# Patient Record
Sex: MALE | URBAN - METROPOLITAN AREA
[De-identification: names, ages, dates, MRNs, and addresses within clinical notes are randomized per-mention and may not be internally consistent; named-entity substitution may affect disease eponyms.]

---

## 2019-12-31 ENCOUNTER — APPOINTMENT (RX ONLY)
Dept: URBAN - METROPOLITAN AREA CLINIC 152 | Facility: CLINIC | Age: 24
Setting detail: DERMATOLOGY
End: 2019-12-31

## 2019-12-31 DIAGNOSIS — L663 OTHER SPECIFIED DISEASES OF HAIR AND HAIR FOLLICLES: ICD-10-CM

## 2019-12-31 DIAGNOSIS — L20.89 OTHER ATOPIC DERMATITIS: ICD-10-CM

## 2019-12-31 DIAGNOSIS — L738 OTHER SPECIFIED DISEASES OF HAIR AND HAIR FOLLICLES: ICD-10-CM

## 2019-12-31 DIAGNOSIS — L73.9 FOLLICULAR DISORDER, UNSPECIFIED: ICD-10-CM

## 2019-12-31 PROBLEM — L02.02 FURUNCLE OF FACE: Status: ACTIVE | Noted: 2019-12-31

## 2019-12-31 PROBLEM — L20.84 INTRINSIC (ALLERGIC) ECZEMA: Status: ACTIVE | Noted: 2019-12-31

## 2019-12-31 PROCEDURE — ? COUNSELING

## 2019-12-31 PROCEDURE — ? PRESCRIPTION

## 2019-12-31 PROCEDURE — 99203 OFFICE O/P NEW LOW 30 MIN: CPT

## 2019-12-31 RX ORDER — CLINDAMYCIN PHOSPHATE AND BENZOYL PEROXIDE 10; 50 MG/G; MG/G
GEL TOPICAL
Qty: 1 | Refills: 2 | Status: ERX | COMMUNITY
Start: 2019-12-31

## 2019-12-31 RX ORDER — TRIAMCINOLONE ACETONIDE 1 MG/G
OINTMENT TOPICAL
Qty: 1 | Refills: 1 | Status: ERX | COMMUNITY
Start: 2019-12-31

## 2019-12-31 RX ADMIN — CLINDAMYCIN PHOSPHATE AND BENZOYL PEROXIDE: 10; 50 GEL TOPICAL at 00:00

## 2019-12-31 RX ADMIN — TRIAMCINOLONE ACETONIDE: 1 OINTMENT TOPICAL at 00:00

## 2019-12-31 ASSESSMENT — LOCATION DETAILED DESCRIPTION DERM
LOCATION DETAILED: LEFT RADIAL DORSAL HAND
LOCATION DETAILED: RIGHT RADIAL DORSAL HAND
LOCATION DETAILED: LEFT CENTRAL SUBMANDIBULAR CHEEK
LOCATION DETAILED: RIGHT MEDIAL MANDIBULAR CHEEK
LOCATION DETAILED: SUBMENTAL CHIN
LOCATION DETAILED: LEFT VENTRAL DISTAL FOREARM
LOCATION DETAILED: LEFT ULNAR DORSAL HAND

## 2019-12-31 ASSESSMENT — LOCATION SIMPLE DESCRIPTION DERM
LOCATION SIMPLE: LEFT HAND
LOCATION SIMPLE: RIGHT CHEEK
LOCATION SIMPLE: SUBMENTAL CHIN
LOCATION SIMPLE: LEFT CHEEK
LOCATION SIMPLE: RIGHT HAND
LOCATION SIMPLE: LEFT FOREARM

## 2019-12-31 ASSESSMENT — LOCATION ZONE DERM
LOCATION ZONE: FACE
LOCATION ZONE: HAND
LOCATION ZONE: ARM

## 2020-12-30 ENCOUNTER — APPOINTMENT (RX ONLY)
Dept: URBAN - METROPOLITAN AREA CLINIC 152 | Facility: CLINIC | Age: 25
Setting detail: DERMATOLOGY
End: 2020-12-30

## 2020-12-30 VITALS — HEIGHT: 70 IN | WEIGHT: 180 LBS

## 2020-12-30 DIAGNOSIS — L738 OTHER SPECIFIED DISEASES OF HAIR AND HAIR FOLLICLES: ICD-10-CM

## 2020-12-30 DIAGNOSIS — L73.9 FOLLICULAR DISORDER, UNSPECIFIED: ICD-10-CM

## 2020-12-30 DIAGNOSIS — L64.8 OTHER ANDROGENIC ALOPECIA: ICD-10-CM

## 2020-12-30 DIAGNOSIS — L663 OTHER SPECIFIED DISEASES OF HAIR AND HAIR FOLLICLES: ICD-10-CM

## 2020-12-30 PROBLEM — J45.909 UNSPECIFIED ASTHMA, UNCOMPLICATED: Status: ACTIVE | Noted: 2020-12-30

## 2020-12-30 PROBLEM — L02.02 FURUNCLE OF FACE: Status: ACTIVE | Noted: 2020-12-30

## 2020-12-30 PROCEDURE — ? COUNSELING

## 2020-12-30 PROCEDURE — 99214 OFFICE O/P EST MOD 30 MIN: CPT

## 2020-12-30 PROCEDURE — ? PRESCRIPTION

## 2020-12-30 RX ORDER — FLUOCINONIDE 0.5 MG/ML
SOLUTION TOPICAL
Qty: 1 | Refills: 3 | Status: ERX | COMMUNITY
Start: 2020-12-30

## 2020-12-30 RX ORDER — CLINDAMYCIN PHOSPHATE AND BENZOYL PEROXIDE 10; 50 MG/G; MG/G
GEL TOPICAL
Qty: 1 | Refills: 2 | Status: ERX

## 2020-12-30 RX ADMIN — FLUOCINONIDE: 0.5 SOLUTION TOPICAL at 00:00

## 2020-12-30 ASSESSMENT — LOCATION DETAILED DESCRIPTION DERM
LOCATION DETAILED: RIGHT CHIN
LOCATION DETAILED: LEFT LATERAL SUBMANDIBULAR CHEEK
LOCATION DETAILED: LEFT SUPERIOR MEDIAL FOREHEAD

## 2020-12-30 ASSESSMENT — LOCATION ZONE DERM: LOCATION ZONE: FACE

## 2020-12-30 ASSESSMENT — LOCATION SIMPLE DESCRIPTION DERM
LOCATION SIMPLE: CHIN
LOCATION SIMPLE: LEFT FOREHEAD
LOCATION SIMPLE: LEFT CHEEK

## 2020-12-31 ENCOUNTER — RX ONLY (OUTPATIENT)
Age: 25
Setting detail: RX ONLY
End: 2020-12-31

## 2020-12-31 RX ORDER — CLINDAMYCIN PHOSPHATE AND BENZOYL PEROXIDE 10; 50 MG/G; MG/G
GEL TOPICAL
Qty: 1 | Refills: 2 | Status: ERX

## 2021-03-31 ENCOUNTER — APPOINTMENT (RX ONLY)
Dept: URBAN - METROPOLITAN AREA CLINIC 152 | Facility: CLINIC | Age: 26
Setting detail: DERMATOLOGY
End: 2021-03-31

## 2021-03-31 DIAGNOSIS — L64.8 OTHER ANDROGENIC ALOPECIA: ICD-10-CM | Status: INADEQUATELY CONTROLLED

## 2021-03-31 DIAGNOSIS — L663 OTHER SPECIFIED DISEASES OF HAIR AND HAIR FOLLICLES: ICD-10-CM | Status: INADEQUATELY CONTROLLED

## 2021-03-31 DIAGNOSIS — L73.9 FOLLICULAR DISORDER, UNSPECIFIED: ICD-10-CM | Status: INADEQUATELY CONTROLLED

## 2021-03-31 DIAGNOSIS — L738 OTHER SPECIFIED DISEASES OF HAIR AND HAIR FOLLICLES: ICD-10-CM | Status: INADEQUATELY CONTROLLED

## 2021-03-31 PROBLEM — L02.02 FURUNCLE OF FACE: Status: ACTIVE | Noted: 2021-03-31

## 2021-03-31 PROBLEM — L02.821 FURUNCLE OF HEAD [ANY PART, EXCEPT FACE]: Status: ACTIVE | Noted: 2021-03-31

## 2021-03-31 PROCEDURE — 99214 OFFICE O/P EST MOD 30 MIN: CPT | Mod: 95

## 2021-03-31 PROCEDURE — ? COUNSELING

## 2021-03-31 PROCEDURE — ? PRESCRIPTION

## 2021-03-31 RX ORDER — CLOBETASOL PROPIONATE 0.5 MG/ML
SOLUTION TOPICAL
Qty: 1 | Refills: 2 | Status: ERX | COMMUNITY
Start: 2021-03-31

## 2021-03-31 RX ORDER — CLINDAMYCIN PHOSPHATE AND BENZOYL PEROXIDE 10; 50 MG/G; MG/G
GEL TOPICAL
Qty: 1 | Refills: 2 | Status: ERX

## 2021-03-31 RX ADMIN — CLOBETASOL PROPIONATE: 0.5 SOLUTION TOPICAL at 00:00

## 2021-03-31 ASSESSMENT — LOCATION DETAILED DESCRIPTION DERM
LOCATION DETAILED: LEFT SUPERIOR PARIETAL SCALP
LOCATION DETAILED: RIGHT SUPERIOR MEDIAL BUCCAL CHEEK
LOCATION DETAILED: RIGHT SUPERIOR LATERAL BUCCAL CHEEK
LOCATION DETAILED: LEFT SUPERIOR LATERAL BUCCAL CHEEK

## 2021-03-31 ASSESSMENT — LOCATION SIMPLE DESCRIPTION DERM
LOCATION SIMPLE: LEFT CHEEK
LOCATION SIMPLE: SCALP
LOCATION SIMPLE: RIGHT CHEEK

## 2021-03-31 ASSESSMENT — LOCATION ZONE DERM
LOCATION ZONE: SCALP
LOCATION ZONE: FACE

## 2022-10-18 ENCOUNTER — OFFICE VISIT (OUTPATIENT)
Dept: INTERNAL MEDICINE | Facility: CLINIC | Age: 27
End: 2022-10-18

## 2022-10-18 VITALS
OXYGEN SATURATION: 97 % | BODY MASS INDEX: 28.58 KG/M2 | HEART RATE: 80 BPM | SYSTOLIC BLOOD PRESSURE: 112 MMHG | TEMPERATURE: 98.2 F | WEIGHT: 193 LBS | HEIGHT: 69 IN | DIASTOLIC BLOOD PRESSURE: 78 MMHG

## 2022-10-18 DIAGNOSIS — Z76.89 ENCOUNTER TO ESTABLISH CARE: Primary | ICD-10-CM

## 2022-10-18 DIAGNOSIS — F41.8 ANXIETY WITH DEPRESSION: ICD-10-CM

## 2022-10-18 PROCEDURE — 99204 OFFICE O/P NEW MOD 45 MIN: CPT | Performed by: NURSE PRACTITIONER

## 2022-10-18 RX ORDER — BUPROPION HYDROCHLORIDE 150 MG/1
150 TABLET ORAL DAILY
Qty: 90 TABLET | Refills: 1 | Status: SHIPPED | OUTPATIENT
Start: 2022-10-18 | End: 2023-03-31

## 2022-10-18 RX ORDER — BUPROPION HYDROCHLORIDE 100 MG/1
TABLET ORAL
COMMUNITY
End: 2022-10-18

## 2022-10-18 NOTE — PROGRESS NOTES
"Chief Complaint   Patient presents with   • Establish Care     Needs med refills     Subjective   Clifton Moran is a 27 y.o. male.     History of Present Illness     Patent presents to establish care and medication refill. Moved here from [place], originally from [place]. Pending records, requested.   Taking Wellbutrin 150 mg XR for mixed anxiety/depression. Currently stable per patient.   No other medical history or concerns.  He did have labs about 6 months ago with previous PCP, but does not think he has had physical exam.   States Vitamin D was low, but that is the only thing they told him about labs. He has been taking otc Vit D supplement and getting 15 mins of sunlight daily.  He is due Tdap, COVID-19 booster, had influenza vaccine last month.  Wants to wait and return for vaccinations on later date.     The following portions of the patient's history were reviewed and updated as appropriate: allergies, current medications, past family history, past medical history, past social history, past surgical history and problem list.    Review of Systems   Psychiatric/Behavioral: Positive for dysphoric mood. The patient is nervous/anxious.    All other systems reviewed and are negative.      Objective   /78   Pulse 80   Temp 98.2 °F (36.8 °C) (Temporal)   Ht 175.3 cm (69\")   Wt 87.5 kg (193 lb)   SpO2 97%   BMI 28.50 kg/m²   Body mass index is 28.5 kg/m².  Physical Exam  Vitals and nursing note reviewed.   Constitutional:       Appearance: Normal appearance.   HENT:      Head: Normocephalic and atraumatic.      Right Ear: External ear normal.      Left Ear: External ear normal.      Nose: Nose normal.   Eyes:      General:         Right eye: No discharge.         Left eye: No discharge.      Extraocular Movements: Extraocular movements intact.   Cardiovascular:      Rate and Rhythm: Normal rate and regular rhythm.   Pulmonary:      Effort: Pulmonary effort is normal.      Breath sounds: Normal breath " sounds.   Musculoskeletal:         General: Normal range of motion.      Cervical back: Normal range of motion.   Neurological:      Mental Status: He is alert and oriented to person, place, and time.   Psychiatric:         Mood and Affect: Mood normal.         Behavior: Behavior normal.         Thought Content: Thought content normal.         Judgment: Judgment normal.       Assessment & Plan   Clifton Moran is here today and the following problems have been addressed:      Diagnoses and all orders for this visit:    1. Encounter to establish care (Primary)    2. Anxiety with depression  -     buPROPion XL (Wellbutrin XL) 150 MG 24 hr tablet; Take 1 tablet by mouth Daily.  Dispense: 90 tablet; Refill: 1      Pleasant 27 year old male presents to establish care today. Refilled Wellbutrin 150 mg, continue to take daily as prescribed.  Tdap administered in office, tolerated well. VIS given.  Pending records/labs from previous PCP.  No other concerns or complaints today.   Will return for COVID-19 booster and physical exam.     Follow Up   Return for Annual.  Patient was given instructions and counseling regarding his condition or for health maintenance advice. Please see specific information pulled into the AVS if appropriate.     America THURMAN  [facility] [place]

## 2023-03-03 DIAGNOSIS — F41.8 ANXIETY WITH DEPRESSION: ICD-10-CM

## 2023-03-03 RX ORDER — BUPROPION HYDROCHLORIDE 150 MG/1
150 TABLET ORAL DAILY
Qty: 90 TABLET | Refills: 1 | Status: CANCELLED | OUTPATIENT
Start: 2023-03-03

## 2023-03-31 DIAGNOSIS — F41.8 ANXIETY WITH DEPRESSION: ICD-10-CM

## 2023-03-31 RX ORDER — BUPROPION HYDROCHLORIDE 150 MG/1
TABLET ORAL
Qty: 30 TABLET | Refills: 5 | Status: SHIPPED | OUTPATIENT
Start: 2023-03-31

## 2023-04-18 ENCOUNTER — OFFICE VISIT (OUTPATIENT)
Dept: INTERNAL MEDICINE | Facility: CLINIC | Age: 28
End: 2023-04-18
Payer: COMMERCIAL

## 2023-04-18 VITALS
WEIGHT: 188 LBS | OXYGEN SATURATION: 98 % | BODY MASS INDEX: 27.85 KG/M2 | HEIGHT: 69 IN | HEART RATE: 79 BPM | DIASTOLIC BLOOD PRESSURE: 60 MMHG | SYSTOLIC BLOOD PRESSURE: 90 MMHG | TEMPERATURE: 97.2 F

## 2023-04-18 DIAGNOSIS — Z11.59 ENCOUNTER FOR HEPATITIS C SCREENING TEST FOR LOW RISK PATIENT: ICD-10-CM

## 2023-04-18 DIAGNOSIS — E55.9 VITAMIN D DEFICIENCY: ICD-10-CM

## 2023-04-18 DIAGNOSIS — Z13.228 SCREENING FOR ENDOCRINE, METABOLIC AND IMMUNITY DISORDER: ICD-10-CM

## 2023-04-18 DIAGNOSIS — Z13.0 SCREENING FOR ENDOCRINE, METABOLIC AND IMMUNITY DISORDER: ICD-10-CM

## 2023-04-18 DIAGNOSIS — Z13.220 SCREENING FOR CHOLESTEROL LEVEL: ICD-10-CM

## 2023-04-18 DIAGNOSIS — Z00.00 ANNUAL PHYSICAL EXAM: Primary | ICD-10-CM

## 2023-04-18 DIAGNOSIS — B35.9 RINGWORM: ICD-10-CM

## 2023-04-18 DIAGNOSIS — Z23 NEED FOR TDAP VACCINATION: ICD-10-CM

## 2023-04-18 DIAGNOSIS — Z13.29 SCREENING FOR ENDOCRINE, METABOLIC AND IMMUNITY DISORDER: ICD-10-CM

## 2023-04-18 DIAGNOSIS — F41.8 ANXIETY WITH DEPRESSION: ICD-10-CM

## 2023-04-18 DIAGNOSIS — Z13.29 SCREENING FOR THYROID DISORDER: ICD-10-CM

## 2023-04-18 DIAGNOSIS — Z13.0 SCREENING FOR DEFICIENCY ANEMIA: ICD-10-CM

## 2023-04-18 DIAGNOSIS — L25.9 CONTACT DERMATITIS, UNSPECIFIED CONTACT DERMATITIS TYPE, UNSPECIFIED TRIGGER: ICD-10-CM

## 2023-04-18 DIAGNOSIS — Z13.1 SCREENING FOR DIABETES MELLITUS (DM): ICD-10-CM

## 2023-04-18 DIAGNOSIS — E66.3 OVERWEIGHT WITH BODY MASS INDEX (BMI) OF 27 TO 27.9 IN ADULT: ICD-10-CM

## 2023-04-18 RX ORDER — BUPROPION HYDROCHLORIDE 150 MG/1
150 TABLET ORAL DAILY
Qty: 90 TABLET | Refills: 3 | Status: SHIPPED | OUTPATIENT
Start: 2023-04-18

## 2023-04-18 RX ORDER — KETOCONAZOLE 20 MG/G
1 CREAM TOPICAL DAILY
Qty: 60 G | Refills: 0 | Status: SHIPPED | OUTPATIENT
Start: 2023-04-18

## 2023-04-18 RX ORDER — CLOBETASOL PROPIONATE 0.5 MG/G
1 OINTMENT TOPICAL 2 TIMES DAILY
Qty: 60 G | Refills: 0 | Status: SHIPPED | OUTPATIENT
Start: 2023-04-18

## 2023-04-18 NOTE — PROGRESS NOTES
Subjective   Clifton Moran is a 27 y.o. male and is here for a comprehensive physical exam.   Patient has a rash on his right hand for 2-3 weeks.  He came in contact with his girlfriend who had poison ivy, that is the only thing he can think of that might have caused the rash.  No new lotions, soaps, detergents.  He has not tried anything on the rash.  Can of itched and bothering him at first, but no pain associated with it today.  It started out as vesicles that popped and drained an dried up afterwards.  He has a rash on the outside of left leg, annular and crusting, it has been there 8+ months, he has just put moisturizer on it  He is needing a refill of his Wellbutrin for anxiety and depression, states this is stable on the medication.    HPI:      Health Habits:  Eye exam within last 2 years? No, will schedule  Dental exam every 6 months? No, will schedule  Exercise habits: 3 days per week   Healthy diet? Unhealthy   Caffeine: None, patient states sensitive to caffeine and causes palpitations  Alcohol: once per month   Nicotine: No   Do you take any herbs or supplements that were not prescribed by a doctor? Vit D supplement, daily MV     History:  Family history of prostate/colon cancer: No      reports being sexually active and has had partner(s) who are female. He reports using the following method of birth control/protection: Pill.    The following portions of the patient's history were reviewed and updated as appropriate: He  has a past medical history of ADHD (attention deficit hyperactivity disorder), Anxiety, Depression, and History of medical problems.  He does not have a problem list on file.  He  has a past surgical history that includes Stomach surgery.  His family history includes Alcohol abuse in his father; Asthma in his brother; Birth defects in his paternal uncle; Depression in his mother.  He  reports that he has never smoked. He does not have any smokeless tobacco history on file. He  "reports that he does not currently use alcohol after a past usage of about 1.0 standard drink per week. He reports that he does not use drugs.  Current Outpatient Medications   Medication Sig Dispense Refill   • buPROPion XL (WELLBUTRIN XL) 150 MG 24 hr tablet Take 1 tablet by mouth Daily. 90 tablet 3   • clobetasol (TEMOVATE) 0.05 % ointment Apply 1 application topically to the appropriate area as directed 2 (Two) Times a Day. Right hand 60 g 0   • ketoconazole (NIZORAL) 2 % cream Apply 1 application topically to the appropriate area as directed Daily. Left leg 60 g 0     No current facility-administered medications for this visit.       Objective   BP 90/60 (BP Location: Right arm, Patient Position: Sitting, Cuff Size: Adult)   Pulse 79   Temp 97.2 °F (36.2 °C) (Temporal)   Ht 175.3 cm (69\")   Wt 85.3 kg (188 lb)   SpO2 98%   BMI 27.76 kg/m²     Physical Exam  Vitals and nursing note reviewed.   Constitutional:       General: He is not in acute distress.     Appearance: Normal appearance. He is overweight. He is not diaphoretic.   HENT:      Head: Normocephalic and atraumatic.      Right Ear: Tympanic membrane, ear canal and external ear normal.      Left Ear: Tympanic membrane, ear canal and external ear normal.      Nose: Nose normal.      Mouth/Throat:      Mouth: Mucous membranes are moist.      Pharynx: Oropharynx is clear.   Eyes:      General: No scleral icterus.     Extraocular Movements: Extraocular movements intact.      Conjunctiva/sclera: Conjunctivae normal.      Pupils: Pupils are equal, round, and reactive to light.   Neck:      Thyroid: No thyromegaly.   Cardiovascular:      Rate and Rhythm: Normal rate and regular rhythm.      Heart sounds: Normal heart sounds.   Pulmonary:      Effort: Pulmonary effort is normal.      Breath sounds: Normal breath sounds.   Abdominal:      General: Abdomen is flat. Bowel sounds are normal. There is no distension.      Palpations: Abdomen is soft.      " Tenderness: There is no abdominal tenderness. There is no guarding.   Musculoskeletal:         General: Normal range of motion.      Cervical back: Normal range of motion and neck supple.   Lymphadenopathy:      Cervical: No cervical adenopathy.   Skin:     General: Skin is warm and dry.      Coloration: Skin is not jaundiced or pale.      Findings: Rash present.      Comments: Lateral left ankle with annular lesion with crusting, appears to be ringworm.  He has macular papular rash on outside of right hand, no vesicles at this time, appears to be healing and drying   Neurological:      Mental Status: He is alert and oriented to person, place, and time.      Cranial Nerves: No cranial nerve deficit.      Motor: No weakness.      Coordination: Coordination normal.      Gait: Gait normal.   Psychiatric:         Mood and Affect: Mood normal.         Behavior: Behavior normal.         Thought Content: Thought content normal.         Judgment: Judgment normal.         Health Maintenance   Topic Date Due   • HEPATITIS C SCREENING  Never done   • ANNUAL PHYSICAL  Never done   • INFLUENZA VACCINE  08/01/2023   • TDAP/TD VACCINES (2 - Td or Tdap) 04/18/2033   • COVID-19 Vaccine  Completed   • Pneumococcal Vaccine 0-64  Aged Out        Assessment & Plan   Healthy male exam.     1.    Diagnosis Plan   1. Annual physical exam  CBC (No Diff)    Comprehensive Metabolic Panel    Lipid Panel    TSH Rfx On Abnormal To Free T4    Hemoglobin A1c    Hepatitis C Antibody  Patient states he is afraid of needles and is going to return with his girlfriend on a separate occasion.  Discussed he needs to be fasting for 8-12 hours before labs are drawn.  Discussed lab hours.        2. Anxiety with depression  buPROPion XL (WELLBUTRIN XL) 150 MG 24 hr tablet  Stable.  Continue medication as prescribed.      3. Contact dermatitis  clobetasol (TEMOVATE) 0.05 % ointment  Rash appears to be healing but likely contact dermatitis from touching  girlfriend who had poison ivy.  Clobetasol twice a day until rash heals.  RTC if persist/worsens      4. Ringworm  ketoconazole (NIZORAL) 2 % cream  Rash on lateral left ankle has been there for 8+ months.  Appears to be ringworm based on exam.  Apply ketoconazole daily until rash disappears, then apply for an additional week.      5. Vitamin D deficiency  Vitamin D 25 hydroxy  Continue vitamin D over-the-counter and try to get 15 minutes of sunlight daily      6. Screening for thyroid disorder  TSH Rfx On Abnormal To Free T4      7. Screening for diabetes mellitus (DM)  Comprehensive Metabolic Panel    Hemoglobin A1c      8. Screening for deficiency anemia  CBC (No Diff)    Lipid Panel      9. Screening for cholesterol level  Lipid Panel      10. Screening for endocrine, metabolic and immunity disorder  Comprehensive Metabolic Panel      11. Need for Tdap vaccination  Tdap Vaccine Greater Than or Equal To 6yo IM  Tdap administered, tolerated well, VIS given      12. Encounter for hepatitis C screening test for low risk patient  Hepatitis C Antibody      13. Overweight with body mass index (BMI) of 27 to 27.9 in adult  BMI is >= 25 and <30. (Overweight) The following options were offered after discussion;: exercise counseling/recommendations and nutrition counseling/recommendations          2. Patient Counseling:  -Health maintenance information provided and discussed  -Counseled on age-appropriate health screenings  -Immunizations for age discussed, encouraged.  -Encouraged 30 minutes of exercise 5 days a week, or 150 minutes total per week.  -Recommend healthy diet choices and portion control   -Discussed importance of regular dental visits and cleanings every 6 months.  -Discussed importance of regular eye exams    3. The BMI is above average; BMI management plan is completed  4. Return in about 1 year (around 4/18/2024) for Annual.        America Rosa, [facility]  04/18/2023  08:26 EDT

## 2023-04-20 LAB
25(OH)D3+25(OH)D2 SERPL-MCNC: 24.1 NG/ML (ref 30–100)
ALBUMIN SERPL-MCNC: 5.2 G/DL (ref 3.5–5.2)
ALBUMIN/GLOB SERPL: 2.3 G/DL
ALP SERPL-CCNC: 88 U/L (ref 39–117)
ALT SERPL-CCNC: 51 U/L (ref 1–41)
AST SERPL-CCNC: 32 U/L (ref 1–40)
BILIRUB SERPL-MCNC: 0.8 MG/DL (ref 0–1.2)
BUN SERPL-MCNC: 10 MG/DL (ref 6–20)
BUN/CREAT SERPL: 9.3 (ref 7–25)
CALCIUM SERPL-MCNC: 10 MG/DL (ref 8.6–10.5)
CHLORIDE SERPL-SCNC: 100 MMOL/L (ref 98–107)
CHOLEST SERPL-MCNC: 214 MG/DL (ref 0–200)
CO2 SERPL-SCNC: 25.4 MMOL/L (ref 22–29)
CREAT SERPL-MCNC: 1.07 MG/DL (ref 0.76–1.27)
EGFRCR SERPLBLD CKD-EPI 2021: 98.8 ML/MIN/1.73
ERYTHROCYTE [DISTWIDTH] IN BLOOD BY AUTOMATED COUNT: 12.6 % (ref 12.3–15.4)
GLOBULIN SER CALC-MCNC: 2.3 GM/DL
GLUCOSE SERPL-MCNC: 85 MG/DL (ref 65–99)
HBA1C MFR BLD: 5.7 % (ref 4.8–5.6)
HCT VFR BLD AUTO: 51 % (ref 37.5–51)
HCV IGG SERPL QL IA: NON REACTIVE
HDLC SERPL-MCNC: 44 MG/DL (ref 40–60)
HGB BLD-MCNC: 17.7 G/DL (ref 13–17.7)
LDLC SERPL CALC-MCNC: 132 MG/DL (ref 0–100)
MCH RBC QN AUTO: 30.9 PG (ref 26.6–33)
MCHC RBC AUTO-ENTMCNC: 34.7 G/DL (ref 31.5–35.7)
MCV RBC AUTO: 89 FL (ref 79–97)
PLATELET # BLD AUTO: 324 10*3/MM3 (ref 140–450)
POTASSIUM SERPL-SCNC: 4.8 MMOL/L (ref 3.5–5.2)
PROT SERPL-MCNC: 7.5 G/DL (ref 6–8.5)
RBC # BLD AUTO: 5.73 10*6/MM3 (ref 4.14–5.8)
SODIUM SERPL-SCNC: 138 MMOL/L (ref 136–145)
TRIGL SERPL-MCNC: 213 MG/DL (ref 0–150)
TSH SERPL DL<=0.005 MIU/L-ACNC: 1.48 UIU/ML (ref 0.27–4.2)
VLDLC SERPL CALC-MCNC: 38 MG/DL (ref 5–40)
WBC # BLD AUTO: 7.12 10*3/MM3 (ref 3.4–10.8)

## 2023-08-11 ENCOUNTER — OFFICE VISIT (OUTPATIENT)
Dept: INTERNAL MEDICINE | Facility: CLINIC | Age: 28
End: 2023-08-11
Payer: COMMERCIAL

## 2023-08-11 ENCOUNTER — HOSPITAL ENCOUNTER (EMERGENCY)
Facility: HOSPITAL | Age: 28
Discharge: HOME | End: 2023-08-11
Attending: STUDENT IN AN ORGANIZED HEALTH CARE EDUCATION/TRAINING PROGRAM
Payer: COMMERCIAL

## 2023-08-11 ENCOUNTER — APPOINTMENT (OUTPATIENT)
Dept: GENERAL RADIOLOGY | Facility: HOSPITAL | Age: 28
End: 2023-08-11
Payer: COMMERCIAL

## 2023-08-11 VITALS
TEMPERATURE: 98.2 F | OXYGEN SATURATION: 98 % | SYSTOLIC BLOOD PRESSURE: 121 MMHG | DIASTOLIC BLOOD PRESSURE: 76 MMHG | BODY MASS INDEX: 26.4 KG/M2 | WEIGHT: 184.4 LBS | HEART RATE: 62 BPM | HEIGHT: 70 IN | RESPIRATION RATE: 16 BRPM

## 2023-08-11 VITALS
BODY MASS INDEX: 27.19 KG/M2 | TEMPERATURE: 97.3 F | HEIGHT: 69 IN | DIASTOLIC BLOOD PRESSURE: 68 MMHG | OXYGEN SATURATION: 96 % | HEART RATE: 75 BPM | WEIGHT: 183.6 LBS | SYSTOLIC BLOOD PRESSURE: 110 MMHG

## 2023-08-11 DIAGNOSIS — S49.91XA RIGHT SHOULDER INJURY, INITIAL ENCOUNTER: ICD-10-CM

## 2023-08-11 DIAGNOSIS — M75.51 BURSITIS OF RIGHT SHOULDER: Primary | ICD-10-CM

## 2023-08-11 DIAGNOSIS — F32.A DEPRESSION, UNSPECIFIED DEPRESSION TYPE: ICD-10-CM

## 2023-08-11 DIAGNOSIS — R45.89 SUICIDAL BEHAVIOR WITHOUT ATTEMPTED SELF-INJURY: Primary | ICD-10-CM

## 2023-08-11 PROCEDURE — 99284 EMERGENCY DEPT VISIT MOD MDM: CPT

## 2023-08-11 PROCEDURE — 73030 X-RAY EXAM OF SHOULDER: CPT

## 2023-08-11 RX ORDER — MELOXICAM 7.5 MG/1
7.5 TABLET ORAL DAILY
Qty: 20 TABLET | Refills: 0 | Status: SHIPPED | OUTPATIENT
Start: 2023-08-11
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
## 2023-08-11 NOTE — THERAPY EVALUATION
"Clifton Moran  1995    Preferred Pronouns: he/him   Race/Ethnicity: White or   Martial Status: Single  Guardian Name/Contact/etc: Self  Pt Lives With:  Significant other  Occupation: Full time   Appearance:  Mildly disheveled      Assessment Start and End: 0945 - 1030    Orientation: alert and oriented to person, place, and time     Is patient agreeable to treatment? Yes - Outpatient treatment    Attention and Cooperation: Normal and Cooperative    Presenting Problems: Patient reports periodic suicidal thoughts over the last month.  He reports he has thought of a method, but denies a specific plan or intent to act on the plan. He also denies acute attempt or behaviors. He reports them as \"fleeting\" thoughts, and voices feeling controllability over these thoughts.  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  He reports not wanting to be so heavily affiliated with the system, as he was a part of this as a child.    Mood: appropriate to circumstances; calm, engaged.    Affect: Full range; Appropriate    Speech: Normal    Eye Contact: Good    Psychomotor Movement: Appropriate    Depression: 7     Anxiety: 7    Sleep: Fair     Appetite: Fair     Delusions:  Denies and none endorsed      Hallucinations: Not demonstrated today; Denies    Homicidal Ideations: Absent     Current Stressors: employment concerns, family problems, mental health condition, relocation, and role changes/responsibilities     Housing Instability and/or Utility Needs: No    Food Insecurity: No    Transportation Needs: No    Established Therapy, Medication Management or Other Mental Health Servivces: Engaged with outpatient therapist Juliana Bazan.  Recently increased frequency of sessions to weekly, " "while he adjusts through this episode of worsening depression.    Current Psychiatric Medications: Wellbutrin  MG    Hx of Psychiatric or Detox Hospitalizations:  Denies.  Patient reports \"I have had friends with inpatient experiences, and I just don't think that is what is going to help me.  In fact, it is taking me away from everything I have that truly does support me and makes me cope.\"    Most recent inpatient admission: N/A      COLUMBIA-SUICIDE SEVERITY RATING SCALE  Psychiatric Inpatient Setting - Discharge Screener    Ask questions that are bold and underlined Discharge   Ask Questions 1 and 2 YES NO   Wish to be Dead:   Person endorses thoughts about a wish to be dead or not alive anymore, or wish to fall asleep and not wake up.  While you were here in the hospital, have you wished you were dead or wished you could go to sleep and not wake up?  No   Suicidal Thoughts:   General non-specific thoughts of wanting to end one's life/die by suicide, "I've thought about killing myself" without general thoughts of ways to kill oneself/associated methods, intent, or plan.   While you were here in the hospital, have you actually had thoughts about killing yourself?  Yes    If YES to 2, ask questions 3, 4, 5, and 6.  If NO to 2, go directly to question 6   3) Suicidal Thoughts with Method (without Specific Plan or Intent to Act):   Person endorses thoughts of suicide and has thought of a least one method during the assessment period. This is different than a specific plan with time, place or method details worked out. "I thought about taking an overdose but I never made a specific plan as to when where or how I would actually do it..and I would never go through with it."   Have you been thinking about how you might kill yourself?  Yes    4) Suicidal Intent (without Specific Plan):   Active suicidal thoughts of killing oneself and patient reports having some intent to act on such thoughts, as opposed to "I have " "the thoughts but I definitely will not do anything about them."   Have you had these thoughts and had some intention of acting on them or do you have some intention of acting on them after you leave the hospital?   No   5) Suicide Intent with Specific Plan:   Thoughts of killing oneself with details of plan fully or partially worked out and person has some intent to carry it out.   Have you started to work out or worked out the details of how to kill yourself either for while you were here in the hospital or for after you leave the hospital? Do you intend to carry out this plan?   No     6) Suicide Behavior    While you were here in the hospital, have you done anything, started to do anything, or prepared to do anything to end your life?    Examples: Took pills, cut yourself, tried to hang yourself, took out pills but didn't swallow any because you changed your mind or someone took them from you, collected pills, secured a means of obtaining a gun, gave away valuables, wrote a will or suicide note, etc.  No     Suicidal: Absent currently.  Patient reports \"fleeting\" thoughts throughout the last month.    Previous Attempts:  Patient denies    Most Recent Attempt: Patient reports feeling suicidal 6 years ago after a friend  by suicide.  He denies acute attempt hx.    PSYCHOSOCIAL HISTORY    Highest Level of Education:  Law School      Family Hx of Mental Health/Substance Abuse: Unknown    Patient Trauma/Abuse History: Further details: Patient alludes to being \"in the family court system\" as a child.  Suggests he struggled as an adolescent.  Does not elaborate further.       Does this require reporting: No    Legal History / History of Violence: Denies significant history of legal issues.     Experience with Interpersonal Violence: Patient does not disclose     History of Inappropriate Sexual Behavior: Patient does not disclose    SUBSTANCE USE HISTORY: Patient does not endorse MERLINE hx - No UDS available.    Does " "the patient have history or current MAT/MOUD: No    WITHDRAWAL: N/A    Current Medical Conditions or Biomedical Complications: ADHD, Anxiety, Depression      DATA:   This therapist received a call from [facility] staff for a behavioral health consult.  The patient is agreeable to speak with the behavioral health team.  Met with patient at bedside. Patient is not under 1:1 security monitoring.  The attending treatment team is EMEKA Carr, Eneida MONTANA RN and Soraya Vaughan.    Patient presents today with chief compliant of depression and recent suicidal ideation, with consideration of method but no specific plan, intent or acute suicidal behaviors. Clifton has a hx of ADHD, Anxiety and Depression.      Patient reports periodic suicidal thoughts over the last month.  He reports he has thought of a method, but denies a specific plan or intent to act on the plan. He also denies acute attempt or behaviors. He reports them as \"fleeting\" thoughts, and voices feeling controllability over these thoughts.  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  He reports not wanting to be so heavily affiliated with the system, as he was a part of this as a child.  He has since proactively increased the frequency of his therapy appointments to cope with his depression.  He has been taking time from work to help cope.  He is also seeking a transfer to another area within the field.  Patient reports his significant other and friends are very supportive.  He reports he likes to create things and has art as an outlet.  He reports his PCP advised he come here by ambulance when expressing his depression, but he does not feel unsafe in his own vehicle or home.  He is interested in a psychiatric medication evaluation by a " "specialist, outside his PCP.  He reports having \"a tumultuous\" time with medication.  He states many SSRIS were ineffective for him, but Wellbutrin has \"worked best so far.\"      Therapist verbally engaged in safety plan with patient of reporting to nearest hospital, or call police/911 if feeling unsafe, if having suicidal or homicidal thoughts, or if in emergent need of medications.  Verbally reviewed information with patient during assessment and suicide prevention/crisis hotlines discussed with patient during assessment.  Patient identified social support and contacts as part of the safety plan.  Patient verbally agreed to safety plan. Patient reports to be agreeable for treatment recommendations.     ASSESSMENT:    Therapist consulted with patient and clinical descriptors are documented above.  Therapist completed CSSRS with patient for suicide risk assessment.  The results of patient's CSSRS documented above. The patient's displays good insight, good impulse control and judgement appears good.     PLAN:    At this time, therapist recommends outpatient treatment based upon the above assessment.  When exploring levels of care, patient reports \"I have had friends with inpatient experiences, and I just don't think that is what is going to help me.  In fact, it is taking me away from everything I have that truly does support me and makes me cope.\" Therapist collaborated with the treatment team who agree to adopt the recommendations.  Therapist discussed recommendations with patient, and patient is agreeable to the plan.  Patient does not present with criteria to warrant an involuntary psychiatric hold at this time as they are not endorsing active suicidal ideation with plan/intent, homicidal ideation with plan/intent or displaying symptoms of an acute psychotic episode without ability to appropriately plan for safety at a lesser restrictive level of care.  The patient identified proactive factors and is agreeable to " establish/engage in outpatient behavioral health services.  Therapist provided resources for outpatient services and discussed the availability of emergency behavioral health services 24/7 through the [facility] [place], operated by [facility].  Assisted patient in identifying risk factors that would indicate the need for higher level of care, such as thoughts to harm self or others and/or self-harming behavior(s). Encouraged patient to call 911, crisis hotlines, or present to the nearest emergency department should symptoms worsen, or in any crisis/emergency. Patient agreeable and voiced understanding.       Brionna Mckeon, [facility]

## 2023-08-11 NOTE — LETTER
August 11, 2023     Patient: Clifton Moran   YOB: 1995   Date of Visit: 8/11/2023       To Whom It May Concern:    It is my medical opinion that Clifton Moran  should be transferred to a different area of work for allowed to work from home to help benefit his health it is imperative that Clifton be allowed to either transfer to a different area or to work from home in my medical opinion.  This would be beneficial to his mental and physical health .           Sincerely,        Ilana Ritchie DO    CC: No Recipients

## 2023-08-11 NOTE — ED PROVIDER NOTES
Subjective   History of Present Illness  Chief Complaint: Right shoulder pain, anxiety and depression  History of Present Illness: This is a 28-year-old male presents today with injury to his right shoulder, was in [facility] 2 weeks ago when he was swimming in a current and felt a pop in his right shoulder, initially had no range of motion but since then has full range of motion of his right shoulder but pain over his right deltoid, no other injury.  Patient also has a history of anxiety and depression on Wellbutrin who prior to the vacation to [facility] was having some suicidal thoughts but no plan and no intent, since then has had no suicidal thoughts but his anxiety and depression has been increased, he says his job which is a  is taking a toll on his mental wellbeing.  Once again denies any suicidal ideations at this time, no plan of suicide, no homicidal ideations.  Does see a therapist in [place], Dr. Bazan  Onset: 2 weeks  Duration: Continuous  Exacerbating / Alleviating factors: None  Associated symptoms: None    Nurses Notes reviewed and agree, including vitals, allergies, social history and prior medical history.    REVIEW OF SYSTEMS: All systems reviewed and not pertinent unless noted.    Positive for: Anxiety depression, right shoulder pain    Negative for: Other review of systems reviewed negative unspecified    Review of Systems    Past Medical History:   Diagnosis Date    ADHD (attention deficit hyperactivity disorder)     Anxiety     Depression     History of medical problems     Pyloric Stenosis       No Known Allergies    Past Surgical History:   Procedure Laterality Date    STOMACH SURGERY         Family History   Problem Relation Age of Onset    Depression Mother     Alcohol abuse Father     Asthma Brother     Birth defects Paternal Uncle         Pyloric stenosis,  from it at birth       Social History     Socioeconomic History    Marital status: Single   Tobacco  Use    Smoking status: Never    Smokeless tobacco: Never   Vaping Use    Vaping Use: Some days   Substance and Sexual Activity    Alcohol use: Not Currently     Alcohol/week: 1.0 standard drink     Types: 1 Drinks containing 0.5 oz of alcohol per week    Drug use: Yes     Types: Marijuana    Sexual activity: Yes     Partners: Female     Birth control/protection: Pill           Objective   Physical Exam  Constitutional:       Appearance: Normal appearance. He is normal weight.   HENT:      Head: Normocephalic and atraumatic.      Nose: Nose normal.      Mouth/Throat:      Mouth: Mucous membranes are moist.      Pharynx: Oropharynx is clear.   Eyes:      Extraocular Movements: Extraocular movements intact.      Pupils: Pupils are equal, round, and reactive to light.   Cardiovascular:      Rate and Rhythm: Normal rate and regular rhythm.      Pulses: Normal pulses.      Heart sounds: Normal heart sounds.   Pulmonary:      Effort: Pulmonary effort is normal.      Breath sounds: Normal breath sounds.   Abdominal:      General: Abdomen is flat. Bowel sounds are normal.      Palpations: Abdomen is soft.   Musculoskeletal:      Cervical back: Neck supple.      Comments: Patient does have mild tenderness over the right deltoid with overhead motion, but empty can test is negative, no deformities of the right shoulder noted, +2 radial pulses bilaterally.   Skin:     General: Skin is warm and dry.      Capillary Refill: Capillary refill takes less than 2 seconds.   Neurological:      General: No focal deficit present.      Mental Status: He is alert and oriented to person, place, and time.   Psychiatric:         Mood and Affect: Mood normal.         Behavior: Behavior normal.         Thought Content: Thought content normal.         Judgment: Judgment normal.      Comments: No suicidal ideations, no homicidal ideations       Procedures           ED Course                                           Medical Decision Making  Initial  impression of presenting illness: 28-year-old male presents with injury to his right shoulder 2 weeks ago, but also increased anxiety and depression without suicidal ideations.    DDX includes but is not limited to anxiety and depression, right shoulder strain, right AC separation or strain    Patient arrives dynamically stable, afebrile without respiratory distress and not suicidal with vitals interpreted by me. Pertinent features from physical exam: Grossly unremarkable.    Initial diagnostic plan: X-ray right shoulder, psychiatry will come down to assess the patient as well    Results from initial plan were reviewed and interpreted by me revealing x-ray is negative other than possible bursitis    Diagnostic information from other sources: None    Interventions / Re-evaluation: None    Results/clinical rationale were discussed with the patient    Consultations/Discussion of results with other physicians: None    Disposition plan: Patient was seen and assessed by our psychiatric unit, they have a follow-up with him next week and deemed him safe for discharge.  Will discharge with anti-inflammatories for his shoulder pain.  He is made aware of return precautions.    Amount and/or Complexity of Data Reviewed  Radiology: ordered.        Final diagnoses:   Bursitis of right shoulder   Depression, unspecified depression type       ED Disposition  ED Disposition       ED Disposition   Discharge    Condition   Stable    Comment   --               America Rosa, APRN  [address]  [place] [id number] [phone]    Go to   As needed, If symptoms worsen         Medication List        New Prescriptions      meloxicam 7.5 MG tablet  Commonly known as: MOBIC  Take 1 tablet by mouth Daily.               Where to Get Your Medications        These medications were sent to [facility]/pharmacy #[id number] - [place], [place] - [address] [facility] - [phone]  - [phone]   [address] [facility] [address]       Phone: [phone]   meloxicam 7.5 MG tablet            Akbar River PA  08/11/23 3402

## 2023-08-11 NOTE — ED NOTES
Patient flagged high risk on suicide screening, but is denying any active SI, plan, or intent. Brionna Mckeon with  contacted, stated patient did not need a sitter. EMEKA Monique and REJI Webber notified. Brionna coming to bedside to assess patient.

## 2023-08-11 NOTE — PATIENT INSTRUCTIONS
[facility]  Please fill out  at home, sign and date, print copy to put on refrigerator or mirror, or other visible space, etc. Keep copy in billfold or purse. Take photo to keep on SmartPhone.   Bring or send copy to primary care provider as well.    8/11/2023   Patient Name: Clifton Moran  YOB: 1995    Suicide Prevention Hotline:   Call 598 or 1-889.695.1090 or text Talk at 229-349    Recognize the warning signs (thoughts, images, mood, thinking processes, behaviors, situations) that a crisis may be developing.  1.          2.   3.  Using internal coping strategies (what can you do on your own to help you not act on your thoughts/urges? i.e. relaxation techniques, physical activity)  1.  2.  3.  Using social contacts to provide support and distraction (who is supportive of you that you can talk to when you are stressed?)  1.  2.  3.  What is one thing that is most important to you and is worth living for?    Making you environment safe (what means do you have access to and are likely to use to attempt suicide? How can you limit access to these means?    Contacting Professional Agencies:  Therapist     Phone #  Psychiatrist/ARNP    Phone #  Primary Care Doctor    Phone #        What might be a barrier to using this safety plan?        Signature_____________________________________Date_____________    Suicidal Feelings: How to Help Yourself  Suicide is when you end your own life. Suicidal ideation includes expressing thoughts about, or a preoccupation with, ending your own life. There are many things you can do to help yourself feel better when struggling with these feelings. Many services and people are available to support you and others who struggle with similar feelings.  If you ever feel like you may hurt yourself or others, or have thoughts about taking your own life, get help right away. To get help:  Go to your nearest emergency department.  Call your local emergency services  (911 in the [place]  Call the [facility] and human services helpline (211 in the [place]  Call or text a suicide hotline to speak with a trained counselor. The following suicide hotlines are available in the [place]:  4-265-737-TALK (1-394.526.6734 or 988 in the [place]  6-677-IJROQQX (1-538.400.9217).  Text 262685. This is the Crisis Text Line in the [place]  1-521.964.8885. This is a hotline for Ukrainian speakers.  1-629.676.4863. This is a hotline for TTY users.  3-540-5-U-CARLITOS (1-163.899.7046). This is a hotline for lesbian, gomes, bisexual, transgender, or questioning youth.  For a list of hotlines in [place], visit suicide.org/hotlines/international/dqrhoo-lfmjlea-sauanijg.html  Contact a crisis center or a local suicide prevention center. To find a crisis center or suicide prevention center:  Call your local hospital, clinic, community service organization, mental health center, social service provider, or health department. Ask for help with connecting to a crisis center.  For a list of crisis centers in the [place], visit: suicidepreventionlifeline.org  For a list of crisis centers in [place], visit: suicideprevention.ca  How to help yourself feel better    Promise yourself that you will not do anything bad or extreme when you have suicidal feelings. Remember the times you have felt hopeful.  Many people have gotten through suicidal thoughts and feelings, and you can too.  If you have had these feelings before, remind yourself that you can get through them again.  Let family, friends, teachers, or counselors know how you are feeling. Do not separate yourself from those who care about you and want to help you.  Talk with someone every day, even if you do not feel like talking to anyone or being with other people.  Face-to-face conversation is best to help them understand your feelings.  Contact a mental health care provider and work with this person regularly.  Make a safety plan that you can  follow during a crisis.  Include phone numbers of suicide prevention hotlines, mental health professionals, and trusted friends and family members you can call during an emergency.  Save these numbers on your phone.  If you are thinking of taking a lot of medicine, give your medicine to someone who can give it to you as prescribed.  If you are on antidepressants and are concerned you will overdose, tell your health care provider so that he or she can give you safer medicines.  Try to stick to your routines and follow a schedule every day. Make self-care a priority.  Make a list of realistic goals, and cross them off when you achieve them. Accomplishments can give you a sense of worth.  Wait until you are feeling better before doing things that you find difficult or unpleasant.  Do things that you have always enjoyed to take your mind off your feelings.  Try reading a book, or listening to or playing music.  Spending time outside, in nature, may help you feel better.  Follow these instructions at home:    Visit your primary health care provider every year for a physical and a mental health checkup.  Take over-the-counter and prescription medicines only as told by your health care provider.  Ask your health care provider about the possible side effects of any medicines you are taking.  Ask your health care provider about whether suicidal ideation is a possible side effect of any of your medicines.  Learn about suicidal ideation and what increases the risk for the development of suicidal thoughts.  Eat a well-balanced diet, and eat regular meals.  Get plenty of rest.  Exercise if you are able. Just 30 minutes of exercise each day can help you feel better.  Keep your living space well lit.  Do not use alcohol or drugs. Remove these substances from your home.  General recommendations  Remove weapons, poisons, knives, and other deadly items from your home.  Work with a mental health care provider as needed.  When you are  feeling well, write yourself a letter with tips and support that you can read when you are not feeling well.  Remember that life's difficulties can be sorted out with help. Conditions can be treated, and you can learn behaviors and ways of thinking that will help you.  Work with your health care provider or counselor to learn ways of coping with your thoughts and feelings.  Where to find more information  National Suicide Prevention Lifeline: www.suicidepreventionlifeline.org  Hopeline: www.hopeline.My Perfect Gig  American Foundation for Suicide Prevention: www.afsp.org  The Mario Project (for lesbian, gomes, bisexual, transgender, or questioning youth): www.thetrevorproject.org  National Lee of Mental Health: www.nimh.nih.gov/health/topics/suicide-prevention  Suicide Prevention Resources: afsp.org/suicide-prevention-resources  Contact a health care provider if:  You feel as though you are a burden to others.  You feel agitated, angry, vengeful, or have extreme mood swings.  You have withdrawn from family and friends.  You are frequently using drugs or alcohol.  Get help right away if:  You are talking about suicide or wishing to die.  You start making plans for how to commit suicide.  You feel that you have no reason to live.  You start making plans for putting your affairs in order, saying goodbye, or giving your possessions away.  You feel guilt, shame, or unbearable pain, and it seems like there is no way out.  You are engaging in risky behaviors that could lead to death.  If you have any of these thoughts or symptoms, get help right away:  Go to your nearest emergency department or crisis center.  Call emergency services (911 in the [place]  Call or text a suicide crisis helpline.  Summary  Suicide is when you take your own life. Suicidal feelings are thoughts about ending your own life.  Promise yourself that you will not do anything bad or extreme when you have suicidal feelings.  Let family, friends, teachers, or  counselors know how you are feeling.  Get help right away if you start making plans for how to commit suicide.  This information is not intended to replace advice given to you by your health care provider. Make sure you discuss any questions you have with your health care provider.  Document Revised: 2022 Document Reviewed: 2022  [employer] Patient Education c  [employer]    Preventing Suicide  You will learn about the warning sign of suicide, and how to prevent suicide.  To view the content, go to this web address:  [email or web address]    This video will  on: 2025. If you need access to this video following this date, please reach out to the healthcare provider who assigned it to you.  This information is not intended to replace advice given to you by your health care provider. Make sure you discuss any questions you have with your health care provider.  [employer] Patient Education c  [employer]

## 2023-08-21 ENCOUNTER — TELEPHONE (OUTPATIENT)
Dept: INTERNAL MEDICINE | Facility: CLINIC | Age: 28
End: 2023-08-21
Payer: COMMERCIAL

## 2023-08-21 NOTE — TELEPHONE ENCOUNTER
Caller: Clifton Moran    Relationship to patient: Self    Best call back number: [phone]     PATIENT WAS TO HAVE AN INCREASE IN HIS WELLBUTRIN, BUT WHEN HE GOT TO THE PHARMACY, IT WAS NOT INCREASED.

## 2023-08-22 DIAGNOSIS — F41.8 ANXIETY WITH DEPRESSION: ICD-10-CM

## 2023-08-22 RX ORDER — BUPROPION HYDROCHLORIDE 300 MG/1
300 TABLET ORAL DAILY
Qty: 90 TABLET | Refills: 3 | Status: SHIPPED | OUTPATIENT
Start: 2023-08-22

## 2023-12-08 ENCOUNTER — TELEPHONE (OUTPATIENT)
Dept: INTERNAL MEDICINE | Facility: CLINIC | Age: 28
End: 2023-12-08
Payer: COMMERCIAL

## 2023-12-08 DIAGNOSIS — F41.8 ANXIETY WITH DEPRESSION: ICD-10-CM

## 2023-12-08 RX ORDER — BUPROPION HYDROCHLORIDE 150 MG/1
150 TABLET ORAL DAILY
Qty: 90 TABLET | Refills: 0 | Status: SHIPPED | OUTPATIENT
Start: 2023-12-08

## 2023-12-08 NOTE — TELEPHONE ENCOUNTER
Caller: Clifton Moran    Relationship: Self    Best call back number: [phone]     What is the best time to reach you: ANY    Who are you requesting to speak with (clinical staff, provider,  specific staff member): NURSE    Do you know the name of the person who called: PATIENT    What was the call regarding: PATIENT DOSAGE WAS INCREASED  [facility].  HE WOULD LIKE TO GO BACK TO  [facility].  HE DOES NOT LIKE THE WAY  MAKES HIM FEEL.      PHARMACY:  [facility]    Is it okay if the provider responds through MyChart: PHONE CALL PLEASE   Procedure Date  4/12/23     Impression  Overall Impression: Mucosa of the esophagus appears to be normal. The stomach has gastritis without ulcers and the duodenum has mild erythema. Biopsies were obtained from the distal esophagus, stomach and duodenum.     Recommendation    Await pathology results      Avoid nsaids, eat small frequent low residue meals; ppi 1/AM; return to GI clinic in 1 month with SEYMOUR Villalobos and consider gastric emptying study if symptoms persist.  Discharge:  Disp: DC  to home in stable condition. Resume diet. No driving x 24 hours; f/u with PCP as scheduled.  Dx: gerd, gastritis, duodenitis   THE NURSE WILL CALL YOU WITH YOUR BIOPSY RESULTS IN A FEW DAYS.   NO DRIVING, OPERATING EQUIPMENT, OR SIGNING LEGAL DOCUMENTS FOR 24 HOURS.

## 2024-03-10 DIAGNOSIS — F41.8 ANXIETY WITH DEPRESSION: ICD-10-CM

## 2024-03-11 RX ORDER — BUPROPION HYDROCHLORIDE 150 MG/1
150 TABLET ORAL DAILY
Qty: 90 TABLET | Refills: 0 | Status: SHIPPED | OUTPATIENT
Start: 2024-03-11

## 2024-04-19 ENCOUNTER — OFFICE VISIT (OUTPATIENT)
Dept: INTERNAL MEDICINE | Facility: CLINIC | Age: 29
End: 2024-04-19
Payer: COMMERCIAL

## 2024-04-19 VITALS
TEMPERATURE: 97.6 F | SYSTOLIC BLOOD PRESSURE: 124 MMHG | WEIGHT: 187 LBS | HEIGHT: 71 IN | DIASTOLIC BLOOD PRESSURE: 68 MMHG | HEART RATE: 67 BPM | OXYGEN SATURATION: 98 % | BODY MASS INDEX: 26.18 KG/M2

## 2024-04-19 DIAGNOSIS — Z13.29 SCREENING FOR ENDOCRINE, METABOLIC AND IMMUNITY DISORDER: ICD-10-CM

## 2024-04-19 DIAGNOSIS — Z13.228 SCREENING FOR ENDOCRINE, METABOLIC AND IMMUNITY DISORDER: ICD-10-CM

## 2024-04-19 DIAGNOSIS — Z13.220 SCREENING FOR CHOLESTEROL LEVEL: ICD-10-CM

## 2024-04-19 DIAGNOSIS — Z13.0 SCREENING FOR DEFICIENCY ANEMIA: ICD-10-CM

## 2024-04-19 DIAGNOSIS — F41.8 ANXIETY WITH DEPRESSION: ICD-10-CM

## 2024-04-19 DIAGNOSIS — Z01.83 ENCOUNTER FOR BLOOD TYPING: ICD-10-CM

## 2024-04-19 DIAGNOSIS — Z00.00 ANNUAL PHYSICAL EXAM: Primary | ICD-10-CM

## 2024-04-19 DIAGNOSIS — Z13.0 SCREENING FOR ENDOCRINE, METABOLIC AND IMMUNITY DISORDER: ICD-10-CM

## 2024-04-19 RX ORDER — BUSPIRONE HYDROCHLORIDE 5 MG/1
5 TABLET ORAL 3 TIMES DAILY
Qty: 60 TABLET | Refills: 0 | Status: SHIPPED | OUTPATIENT
Start: 2024-04-19

## 2024-04-19 NOTE — PROGRESS NOTES
Male Physical Note      Date: 2024   Patient Name: Clifton Moran  : 1995   MRN: [id number]     Chief Complaint:    Chief Complaint   Patient presents with    Annual Exam       History of Present Illness:   History of Present Illness  Clifton Moran is a 28 y.o. male who is here today for annual health maintenance and physical.    The patient was initiated on Wellbutrin 150 mg which he continues to take for anxiety/depression. He reports experiencing concentration difficulties and expresses a desire to resume ADHD medication. He intends to consult a psychiatrist or psychologist, but wishes to delay this until he secures employment. The 300 mg dosage resulted in feelings of fogginess and emotional instability, prompting him to revert to the 150 mg dosage. He maintains regular therapy sessions, with the therapist's basic prognosis indicating that his anxiety is more severe, which triggers depression. His attention span and driving abilities have also been compromised. He has previously been prescribed several medications for anxiety, the names of which he can not recall. He is losing insurance due to job loss and moving in 3 months so he will look into scheduling with psychiatry on a later date.     He is not utd with dental or vision exams. He does not smoke cigarettes. He uses marijuana recreationally. He typically aims to use more food-based versions. He has been drinking 2 drinks per day for the past 2 days since losing his job, lanette and coke.     Requesting blood type be drawn with labs         Subjective      Review of Systems:   Review of Systems   Psychiatric/Behavioral:  Positive for decreased concentration, depressed mood and stress. The patient is nervous/anxious.    All other systems reviewed and are negative.      Past Medical History, Social History, Family History and Care Team were all reviewed with patient and updated as appropriate.     Medications:     Current Outpatient  "Medications:     buPROPion XL (WELLBUTRIN XL) 150 MG 24 hr tablet, TAKE 1 TABLET BY MOUTH EVERY DAY, Disp: 90 tablet, Rfl: 0    busPIRone (BUSPAR) 5 MG tablet, Take 1 tablet by mouth 3 (Three) Times a Day., Disp: 60 tablet, Rfl: 0    Allergies:   No Known Allergies    Immunizations:  Immunization History   Administered Date(s) Administered    COVID-19 (PFIZER) BIVALENT 12+YRS 11/06/2022    COVID-19 F23 (PFIZER) 12YRS+ (COMIRNATY) 12/15/2023    Covid-19 (Pfizer) Gray Cap Monovalent 05/04/2021, 05/25/2021, 12/04/2021    Fluzone Quad >6mos (Multi-dose) 09/28/2022    Tdap 04/18/2023      Tobacco Use: Low Risk  (4/19/2024)    Patient History     Smoking Tobacco Use: Never     Smokeless Tobacco Use: Never     Passive Exposure: Not on file       Social History     Substance and Sexual Activity   Alcohol Use Not Currently    Alcohol/week: 1.0 standard drink of alcohol    Types: 1 Drinks containing 0.5 oz of alcohol per week        Social History     Substance and Sexual Activity   Drug Use Yes    Types: Marijuana      Sexual health: No concerns    Objective     Physical Exam:  Vital Signs:   Vitals:    04/19/24 0840   BP: 124/68   Pulse: 67   Temp: 97.6 °F (36.4 °C)   SpO2: 98%   Weight: 84.8 kg (187 lb)   Height: 180.3 cm (71\")   PainSc:   1     Body mass index is 26.08 kg/m².   BMI is >= 25 and <30. (Overweight) The following options were offered after discussion;: Information on healthy weight added to patient's after visit summary.       Physical Exam  Vitals and nursing note reviewed.   Constitutional:       General: He is not in acute distress.     Appearance: Normal appearance.   HENT:      Head: Normocephalic and atraumatic.      Right Ear: Tympanic membrane, ear canal and external ear normal.      Left Ear: Tympanic membrane, ear canal and external ear normal.      Nose: Nose normal.      Mouth/Throat:      Mouth: Mucous membranes are moist.      Pharynx: Oropharynx is clear.   Eyes:      General: No scleral " icterus.     Extraocular Movements: Extraocular movements intact.      Conjunctiva/sclera: Conjunctivae normal.      Pupils: Pupils are equal, round, and reactive to light.   Neck:      Thyroid: No thyromegaly.   Cardiovascular:      Rate and Rhythm: Normal rate and regular rhythm.      Pulses: Normal pulses.      Heart sounds: Normal heart sounds.   Pulmonary:      Effort: Pulmonary effort is normal.      Breath sounds: Normal breath sounds.   Abdominal:      General: Abdomen is flat. Bowel sounds are normal. There is no distension.      Palpations: Abdomen is soft.      Tenderness: There is no abdominal tenderness. There is no guarding.   Musculoskeletal:         General: Normal range of motion.      Cervical back: Normal range of motion and neck supple.      Right lower leg: No edema.      Left lower leg: No edema.   Lymphadenopathy:      Cervical: No cervical adenopathy.   Skin:     General: Skin is warm and dry.      Coloration: Skin is not jaundiced.      Findings: No rash.   Neurological:      General: No focal deficit present.      Mental Status: He is alert and oriented to person, place, and time.      Cranial Nerves: No cranial nerve deficit.      Motor: No weakness.      Coordination: Coordination normal.      Gait: Gait normal.   Psychiatric:         Mood and Affect: Mood normal.         Behavior: Behavior normal.         Thought Content: Thought content normal.         Assessment / Plan      Assessment/Plan:   Problem List Items Addressed This Visit    None  Visit Diagnoses       Annual physical exam    -  Primary    Relevant Orders    CBC (No Diff)    Comprehensive Metabolic Panel    Lipid Panel    TSH Rfx On Abnormal To Free T4    Anxiety with depression        Relevant Medications    busPIRone (BUSPAR) 5 MG tablet  Initiate BuSpar 5 mg to take 3 times a day as needed.  Continue Wellbutrin.  Patient is going to schedule with psychiatry when he gets new insurance.  Denies SI.    Screening for  deficiency anemia        Relevant Orders    CBC (No Diff)    Screening for cholesterol level        Relevant Orders    Lipid Panel    Screening for endocrine, metabolic and immunity disorder        Relevant Orders    Comprehensive Metabolic Panel    TSH Rfx On Abnormal To Free T4    Encounter for blood typing        Relevant Orders    ABO/Rh            Healthcare Maintenance:  Counseling provided based on age appropriate USPSTF guidelines.  Encouraged 150 minutes of exercise total per week for heart health   Recommend balanced diet, portion control, limiting excess carbs and sweets, limit caffeine   Dental visits twice per year, yearly eye exams, yearly skin assessments with dermatology     BMI is >= 25 and <30. (Overweight) The following options were offered after discussion;: information on healthy weight added to patient's after visit summary       Clifton HoweNeill voices understanding and acceptance of this advice and will call back with any further questions or concerns. AVS with preventive healthcare tips printed for patient.       Follow Up:   Return in about 1 year (around 4/19/2025) for Annual.      [facility]  [facility]     Patient or patient representative verbalized consent for the use of Ambient Listening during the visit with  [facility] for chart documentation. 4/19/2024  09:06 EDT

## 2024-06-14 DIAGNOSIS — F41.8 ANXIETY WITH DEPRESSION: ICD-10-CM

## 2024-06-14 RX ORDER — BUPROPION HYDROCHLORIDE 150 MG/1
150 TABLET ORAL DAILY
Qty: 90 TABLET | Refills: 1 | Status: SHIPPED | OUTPATIENT
Start: 2024-06-14

## 2025-08-04 ENCOUNTER — TELEPHONE (OUTPATIENT)
Age: 30
End: 2025-08-04
Payer: COMMERCIAL